# Patient Record
(demographics unavailable — no encounter records)

---

## 2025-01-21 NOTE — CONSULT LETTER
[Dear  ___] : Dear  [unfilled], [Consult Letter:] : I had the pleasure of evaluating your patient, [unfilled]. [Please see my note below.] : Please see my note below. [Consult Closing:] : Thank you very much for allowing me to participate in the care of this patient.  If you have any questions, please do not hesitate to contact me. [Sincerely,] : Sincerely, [FreeTextEntry3] : Yeyo You MD Professor, Cardiovascular & Thoracic Surgery [facility] [employer] Director of the [facility]  Director of Thoracic Surgery, [address] [place] [address] Phone: [phone] Fax: [phone]

## 2025-01-21 NOTE — HISTORY OF PRESENT ILLNESS
[FreeTextEntry1] : 73-year-old male, former smoker (50+ pack years), with hx of COPD, Hep B, DM, HLD, chronic collapse of left lung, pulmonary nodules since 2016. He was referred by Dr. Ortega Victor. 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 RML revealing fibrosis and chronic inflammation.  The CT chest on 10/08/21 revealed ground-glass opacities on the previous suture line in the right upper lobe, which has increased over time. Decided to close monitor given pt's poor lung function. Patient is following Dr. Ortega Victor for chronic lung disease management.   He presents today for a follow up to review the recent CT chest. Report is as follows:  CT chest on 12/12/2024:  -Right upper lobe wedge resection with interval increases in size of adjacent ground-glass opacities, largest now measuring up to 4 cm.  -Right upper lobe 5 mm nodule, similar.   -Chronic left lobar bronchiectasis and collapse, similar. -Calcified left pleural plaque, similar. -Coronary calcifications. -Sequela of prior granulomatous disease. -Cholelithiasis.

## 2025-01-21 NOTE — CONSULT LETTER
[Dear  ___] : Dear  [unfilled], [Consult Letter:] : I had the pleasure of evaluating your patient, [unfilled]. [Please see my note below.] : Please see my note below. [Consult Closing:] : Thank you very much for allowing me to participate in the care of this patient.  If you have any questions, please do not hesitate to contact me. [Sincerely,] : Sincerely, [FreeTextEntry3] : Yeyo You MD Professor, Cardiovascular & Thoracic Surgery [facility] [employer] Director of the [facility]  Director of Thoracic Surgery, [address] [place] [address] Phone: [phone] Fax: [phone]

## 2025-01-21 NOTE — ASSESSMENT
[FreeTextEntry1] : 73-year-old male, former smoker (50+ pack years), with hx of COPD, Hep B, DM, HLD, chronic collapse of left lung, pulmonary nodules since 2016. He was referred by Dr. Ortega Victor. 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 RML revealing fibrosis and chronic inflammation.  The CT chest on 10/08/21 revealed ground-glass opacities on the previous suture line in the right upper lobe, which has increased over time. Decided to close monitor given pt's poor lung function. Patient is following Dr. Ortega Victor for chronic lung disease management. He presents today for a follow up to review the recent CT chest.    I have independently reviewed the medical records and imaging at the time of this office consultation, and discussed the following interpretations with the patient: CT chest on 12/12/2024 revealed right upper lobe wedge resection with interval increases in size of adjacent ground-glass opacities, largest now measuring up to 4 cm. Given the imaging findings and the patient's risk factors, there is concern for an indolent malignancy. Due to patient's poor pulmonary function, he is not a good surgical candidate. We will refer patient to oncologist Dr. Bertram Zaidi for further evaluation. We'll also plan for a CT chest in 6 months for follow up. Patient agreed with the plan.   Plan:  1. Refer to oncologist Dr. Bertram Fabian  2. RTC in 6 months with CT chest no contrast   ISolomon RN, am scribing for and the presence of Dr. ASHANTI ELISE the following sections: history of present illness, past medical/family/surgical/family/social history, review of systems, vital signs, physical exam, and disposition.  IASHANTI, personally performed the services described in the documentation, reviewed the documentation recorded by the scribe in my presence and it accurately and completely records my words and actions.

## 2025-01-21 NOTE — PHYSICAL EXAM
[] : no respiratory distress [Auscultation Breath Sounds / Voice Sounds] : lungs were clear to auscultation bilaterally [Examination Of The Chest] : the chest was normal in appearance [Chest Visual Inspection Thoracic Asymmetry] : no chest asymmetry [Diminished Respiratory Excursion] : normal chest expansion [Abnormal Walk] : normal gait [Nail Clubbing] : no clubbing  or cyanosis of the fingernails [Motor Tone] : muscle strength and tone were normal [Musculoskeletal - Swelling] : no joint swelling seen [Oriented To Time, Place, And Person] : oriented to person, place, and time [Impaired Insight] : insight and judgment were intact [Affect] : the affect was normal

## 2025-01-21 NOTE — DATA REVIEWED
[FreeTextEntry1] : CT chest on 06/12/2024:  -Right upper lobe wedge resection, with interval increase in size of several adjacent ground-glass opacities, largest now measuring up to 3.6 cm as described above.   -Chronic left lobar collapse and bronchiectasis, similar. -Calcified left pleural plaque, similar. -Coronary calcifications. -Sequela of prior granulomatous disease.  CT chest completed on 05/24/2023 -Chronic complete left lung collapse with bronchiectasis and pleural based calcification. Stable compensatory hyperaeration of the right lung. -Prior right upper lobe wedge resection. -Since May 2022, there has been increase in size and prominence of the yadiel sutural ground-glass opacities now measuring up to 3.4 cm. -Evidence of prior granulomatous disease. -Cholelithiasis.  CT chest on 05/05/2022 - chronic complete collapse of the left lung with bronchiectasis and pleural based calcifications, and compensatory hypertrophy of the right lung. - prior RUL wedge resection. - Persistent nodular perisutural GGOS measuring up to 2.7cm, similar to the 10/2021 exam. Remains suspicious for lesions in the adenocarcinoma spectrum. - evidence of prior granulomatous disease.  CT chest 10/8/21 -chronic complete collapse of the left lung with associated bronchiectasis and pleural based calcifications and wih compensatory hypertrophy of the right lung, similar to priors. -evidence of prior wedge resection in the right anterior upper lobe. Developing nodule ground-glass opacities on either sides of the suture line, measuring up to 2cm. These are more pronounced since previous examinations. Developing malignancy in the adenocarcinomatous spectrum is not excluded. At present, these ground-glass nodules do not have significant solid components and PET/CT would likely not be helpful in further characterization. -sequela of prior granulomatous disease.  CT Chest 10/11/20 - persistent collapse of the left lung with chronic bronchiectasis - Compensatory hyperinflation of the right lung. Stable postsurgical changes in the right lung, s/p wedge resection - right lung paraseptal emphysematous change - Evidence of prior granulomatous disease and probable asbestos exposure  CT Chest 1/10/20 - No significant interval change over prior serial studies. No new pulmonary nodules or infiltrates. - Stable postsurgical changes right lung associated compensatory hyperinflation. Stable mild subpleural scarring and right lung. - Stable near-complete atelectasis of left lung with underlying chronic marked bronchiectasis. - Stable mediastinal shift to left secondary to near complete atelectasis of left lung.

## 2025-01-21 NOTE — CONSULT LETTER
[Dear  ___] : Dear  [unfilled], [Consult Letter:] : I had the pleasure of evaluating your patient, [unfilled]. [Please see my note below.] : Please see my note below. [Consult Closing:] : Thank you very much for allowing me to participate in the care of this patient.  If you have any questions, please do not hesitate to contact me. [Sincerely,] : Sincerely, [FreeTextEntry3] : Yeyo You MD Professor, Cardiovascular & Thoracic Surgery [facility] [employer] Director of the [facility]  Director of Thoracic Surgery, [address] [place] [address] Phone: [phone] Fax: [phone]